# Patient Record
(demographics unavailable — no encounter records)

---

## 2025-01-07 NOTE — ASSESSMENT
[FreeTextEntry1] : Will inject right shoulder with cortisone Do course of PT for neck and shoulder Diclofenac/Methocarbamol prescribed

## 2025-01-07 NOTE — IMAGING
[No bony abnormalities] : No bony abnormalities [Right] : right shoulder [There are no fractures, subluxations or dislocations. No significant abnormalities are seen] : There are no fractures, subluxations or dislocations. No significant abnormalities are seen

## 2025-01-07 NOTE — PROCEDURE
[Large Joint Injection] : Large joint injection [Right] : of the right [Subacromial Space] : subacromial space [Pain] : pain [Alcohol] : alcohol [Betadine] : betadine [Ethyl Chloride sprayed topically] : ethyl chloride sprayed topically [Sterile technique used] : sterile technique used [___ cc    3mg] :  Betamethasone (Celestone) ~Vcc of 3mg [___ cc    1%] : Lidocaine ~Vcc of 1%  [] : Patient tolerated procedure well [Call if redness, pain or fever occur] : call if redness, pain or fever occur [Apply ice for 15min out of every hour for the next 12-24 hours as tolerated] : apply ice for 15 minutes out of every hour for the next 12-24 hours as tolerated [Patient was advised to rest the joint(s) for ____ days] : patient was advised to rest the joint(s) for [unfilled] days [Previous OTC use and PT nontherapeutic] : patient has tried OTC's including aspirin, Ibuprofen, Aleve, etc or prescription NSAIDS, and/or exercises at home and/or physical therapy without satisfactory response [Patient had decreased mobility in the joint] : patient had decreased mobility in the joint [Risks, benefits, alternatives discussed / Verbal consent obtained] : the risks benefits, and alternatives have been discussed, and verbal consent was obtained

## 2025-01-07 NOTE — HISTORY OF PRESENT ILLNESS
[Localized] : localized [Radiating] : radiating [Tingling] : tingling [Constant] : constant [de-identified] : Has pain right shoulder blade/shoulder down to wrist on right for past 2 weeks. Does a lot of repetitive lifting at work. No injury. No numbness or tingling. Has had CTR on right, has pre diabetes.  [] : no [FreeTextEntry1] : RT shoulder, RT wrist  [FreeTextEntry5] : neck, RT shoulder, RT wrist pain that developed 2 week ago. Pain had worsened throughout duration

## 2025-02-28 NOTE — HISTORY OF PRESENT ILLNESS
[Localized] : localized [Radiating] : radiating [Tingling] : tingling [Constant] : constant [de-identified] : 02/28/2025 Doing PT, making progress. Still gets electric sensations wrist to palm with certain movements  Has pain right shoulder blade/shoulder down to wrist on right for past 2 weeks. Does a lot of repetitive lifting at work. No injury. No numbness or tingling. Has had CTR on right, has pre diabetes.  [] : no [FreeTextEntry1] : RT shoulder, RT wrist  [FreeTextEntry5] : neck, RT shoulder, RT wrist pain that developed 2 week ago. Pain had worsened throughout duration

## 2025-02-28 NOTE — ASSESSMENT
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